# Patient Record
Sex: MALE | Race: ASIAN
[De-identification: names, ages, dates, MRNs, and addresses within clinical notes are randomized per-mention and may not be internally consistent; named-entity substitution may affect disease eponyms.]

---

## 2019-03-21 ENCOUNTER — HOSPITAL ENCOUNTER (EMERGENCY)
Dept: HOSPITAL 4 - SED | Age: 69
Discharge: HOME | End: 2019-03-21
Payer: COMMERCIAL

## 2019-03-21 VITALS — BODY MASS INDEX: 21.26 KG/M2 | WEIGHT: 157 LBS | HEIGHT: 72 IN

## 2019-03-21 VITALS — SYSTOLIC BLOOD PRESSURE: 135 MMHG

## 2019-03-21 VITALS — SYSTOLIC BLOOD PRESSURE: 113 MMHG

## 2019-03-21 DIAGNOSIS — B35.4: ICD-10-CM

## 2019-03-21 DIAGNOSIS — L03.116: Primary | ICD-10-CM

## 2019-03-21 DIAGNOSIS — N40.0: ICD-10-CM

## 2019-03-21 PROCEDURE — 99283 EMERGENCY DEPT VISIT LOW MDM: CPT

## 2019-03-21 PROCEDURE — 96372 THER/PROPH/DIAG INJ SC/IM: CPT

## 2019-03-21 NOTE — NUR
Pt brought by self,A&Ox3,non -verbal, Hx of sychosis, pt presents to ER with 
generalized skin rash starting couple months ago and not resolving , pt here to 
r/o scabies, pt was given Rocephin and Clindamycin yesterday.

## 2019-03-21 NOTE — NUR
Patient given written and verbal discharge instructions and verbalizes 
understanding.  ER MD discussed with patient the results and treatment 
provided. Patient in stable condition. ID arm band 

Rx of Clortimazole given. Patient educated on pain management and to follow up 
with PMD. Pain Scale 0/10 .Opportunity for questions provided and answered. 
Medication side effect fact sheet provided. patient left in stable condition.